# Patient Record
Sex: FEMALE | Race: WHITE | NOT HISPANIC OR LATINO | URBAN - METROPOLITAN AREA
[De-identification: names, ages, dates, MRNs, and addresses within clinical notes are randomized per-mention and may not be internally consistent; named-entity substitution may affect disease eponyms.]

---

## 2020-07-25 ENCOUNTER — TELEPHONE ENCOUNTER (OUTPATIENT)
Dept: URBAN - METROPOLITAN AREA CLINIC 13 | Facility: CLINIC | Age: 70
End: 2020-07-25

## 2020-07-25 RX ORDER — POLYETHYLENE GLYCOL 3350, SODIUM SULFATE, SODIUM CHLORIDE, POTASSIUM CHLORIDE, ASCORBIC ACID, SODIUM ASCORBATE 7.5-2.691G
USE AS DIRECTED KIT ORAL
Qty: 1 | Refills: 0 | OUTPATIENT
Start: 2014-07-31 | End: 2014-10-07

## 2020-07-25 RX ORDER — POLYETHYLENE GLYCOL 3350, SODIUM SULFATE, SODIUM CHLORIDE, POTASSIUM CHLORIDE, ASCORBIC ACID, SODIUM ASCORBATE 7.5-2.691G
DRINK 1 LITER OF SOLUTION AT 5:00 PM THE DAY BEFORE PROCEDURE; DRINK 1 LITER OF SOLUTION 6 HOURS PRIOR TO PROCEDURE KIT ORAL
Qty: 2000 | Refills: 0 | OUTPATIENT
Start: 2019-06-27 | End: 2019-10-07

## 2020-07-25 RX ORDER — METOPROLOL SUCCINATE 25 MG/1
TAKE 1 TABLET DAILY TABLET, EXTENDED RELEASE ORAL
Refills: 0 | OUTPATIENT
Start: 2014-07-31 | End: 2016-12-07

## 2020-07-25 RX ORDER — PROPAFENONE HYDROCHLORIDE 150 MG/1
TAKE 1 TABLET DAILY TABLET, FILM COATED ORAL
Refills: 0 | OUTPATIENT
End: 2019-05-07

## 2020-07-26 ENCOUNTER — TELEPHONE ENCOUNTER (OUTPATIENT)
Dept: URBAN - METROPOLITAN AREA CLINIC 13 | Facility: CLINIC | Age: 70
End: 2020-07-26

## 2020-07-26 RX ORDER — NALOXONE HYDROCHLORIDE 4 MG/.1ML
SPRAY NASAL
Qty: 2 | Refills: 0 | Status: ACTIVE | COMMUNITY
Start: 2019-06-21

## 2020-07-26 RX ORDER — OMEPRAZOLE 40 MG/1
TAKE 1 CAPSULE BY MOUTH ONCE DAILY CAPSULE, DELAYED RELEASE ORAL
Qty: 90 | Refills: 3 | Status: ACTIVE | COMMUNITY
Start: 2014-10-09

## 2020-07-26 RX ORDER — CYCLOSPORINE 0.5 MG/ML
EMULSION OPHTHALMIC
Qty: 60 | Refills: 0 | Status: ACTIVE | COMMUNITY
Start: 2018-11-28

## 2020-07-26 RX ORDER — IBUPROFEN 800 MG/1
TABLET ORAL
Qty: 30 | Refills: 0 | Status: ACTIVE | COMMUNITY
Start: 2019-06-21

## 2020-07-26 RX ORDER — 1.1% SODIUM FLUORIDE 11 MG/G
USE TO BRUSH TEETH TWICE A DAY. DO NOT EAT OR DRINK FOR 30 MINUTES AFT GEL DENTAL
Qty: 56 | Refills: 0 | Status: ACTIVE | COMMUNITY
Start: 2019-05-21

## 2020-07-26 RX ORDER — PRAVASTATIN SODIUM 20 MG/1
TAKE 1 TABLET DAILY AS DIRECTED TABLET ORAL
Refills: 0 | Status: ACTIVE | COMMUNITY
Start: 2014-07-31

## 2020-07-26 RX ORDER — CYCLOSPORINE 0.5 MG/ML
EMULSION OPHTHALMIC
Qty: 60 | Refills: 0 | Status: ACTIVE | COMMUNITY
Start: 2019-05-16

## 2020-07-26 RX ORDER — CHLORHEXIDINE GLUCONATE 0.12% ORAL RINSE 1.2 MG/ML
SOLUTION ORAL
Qty: 473 | Refills: 0 | Status: ACTIVE | COMMUNITY
Start: 2019-06-21

## 2020-07-26 RX ORDER — FLECAINIDE ACETATE 100 MG/1
TAKE 1 TABLET EVERY 12 HOURS DAILY TABLET ORAL
Refills: 0 | Status: ACTIVE | COMMUNITY

## 2020-07-26 RX ORDER — PRAVASTATIN SODIUM 20 MG/1
TABLET ORAL
Qty: 30 | Refills: 0 | Status: ACTIVE | COMMUNITY
Start: 2013-12-03

## 2020-07-26 RX ORDER — ETODOLAC 400 MG/1
TAKE 1 TABLET BY MOUTH THREE TIMES DAILY AS NEEDED FOR PAIN TABLET, COATED ORAL
Qty: 15 | Refills: 0 | Status: ACTIVE | COMMUNITY
Start: 2019-07-01

## 2020-07-26 RX ORDER — HYDROMORPHONE HYDROCHLORIDE 4 MG/1
TABLET ORAL
Qty: 16 | Refills: 0 | Status: ACTIVE | COMMUNITY
Start: 2019-06-21

## 2020-07-26 RX ORDER — ONDANSETRON 8 MG/1
TABLET ORAL
Qty: 6 | Refills: 0 | Status: ACTIVE | COMMUNITY
Start: 2019-06-21

## 2020-07-26 RX ORDER — FLECAINIDE ACETATE 50 MG/1
TABLET ORAL
Qty: 60 | Refills: 0 | Status: ACTIVE | COMMUNITY
Start: 2019-06-10

## 2020-07-26 RX ORDER — NEBIVOLOL HYDROCHLORIDE 5 MG/1
TAKE 1 TABLET DAILY TABLET ORAL
Qty: 30 | Refills: 3 | Status: ACTIVE | COMMUNITY

## 2020-07-26 RX ORDER — CHLORHEXIDINE GLUCONATE 0.12% ORAL RINSE 1.2 MG/ML
SOLUTION ORAL
Qty: 473 | Refills: 0 | Status: ACTIVE | COMMUNITY
Start: 2019-08-28

## 2020-12-22 ENCOUNTER — TELEPHONE ENCOUNTER (OUTPATIENT)
Dept: URBAN - METROPOLITAN AREA CLINIC 113 | Facility: CLINIC | Age: 70
End: 2020-12-22

## 2020-12-22 RX ORDER — OMEPRAZOLE 40 MG/1
TAKE 1 CAPSULE BY MOUTH ONCE DAILY CAPSULE, DELAYED RELEASE ORAL ONCE A DAY
Qty: 90 | Refills: 2
Start: 2014-10-09

## 2021-09-14 ENCOUNTER — ERX REFILL RESPONSE (OUTPATIENT)
Dept: URBAN - METROPOLITAN AREA CLINIC 113 | Facility: CLINIC | Age: 71
End: 2021-09-14

## 2021-09-14 RX ORDER — OMEPRAZOLE 40 MG/1
TAKE 1 CAPSULE BY MOUTH ONCE DAILY CAPSULE, DELAYED RELEASE ORAL ONCE A DAY
Qty: 90 | Refills: 2 | OUTPATIENT

## 2021-09-14 RX ORDER — OMEPRAZOLE 40 MG/1
TAKE 1 CAPSULE BY MOUTH ONCE DAILY FOR 90 DAYS CAPSULE, DELAYED RELEASE ORAL
Qty: 90 CAPSULE | Refills: 1 | OUTPATIENT

## 2021-12-16 ENCOUNTER — TELEPHONE ENCOUNTER (OUTPATIENT)
Dept: URBAN - METROPOLITAN AREA CLINIC 113 | Facility: CLINIC | Age: 71
End: 2021-12-16

## 2021-12-16 ENCOUNTER — ERX REFILL RESPONSE (OUTPATIENT)
Dept: URBAN - METROPOLITAN AREA CLINIC 113 | Facility: CLINIC | Age: 71
End: 2021-12-16

## 2021-12-16 RX ORDER — OMEPRAZOLE 40 MG/1
1 CAPSULE 30 MINUTES BEFORE MORNING MEAL CAPSULE, DELAYED RELEASE ORAL ONCE A DAY
Qty: 90 | Refills: 0

## 2021-12-16 RX ORDER — OMEPRAZOLE 40 MG/1
TAKE 1 CAPSULE BY MOUTH ONCE DAILY FOR 90 DAYS CAPSULE, DELAYED RELEASE ORAL
Qty: 90 CAPSULE | Refills: 1 | OUTPATIENT

## 2021-12-16 RX ORDER — OMEPRAZOLE 40 MG/1
TAKE 1 CAPSULE BY MOUTH ONCE DAILY CAPSULE, DELAYED RELEASE ORAL
Qty: 90 CAPSULE | Refills: 1 | OUTPATIENT

## 2022-03-08 ENCOUNTER — TELEPHONE ENCOUNTER (OUTPATIENT)
Dept: URBAN - METROPOLITAN AREA CLINIC 113 | Facility: CLINIC | Age: 72
End: 2022-03-08

## 2022-03-08 RX ORDER — OMEPRAZOLE 40 MG/1
TAKE 1 CAPSULE BY MOUTH ONCE DAILY CAPSULE, DELAYED RELEASE ORAL ONCE A DAY
Qty: 90 | Refills: 0

## 2022-04-07 ENCOUNTER — OFFICE VISIT (OUTPATIENT)
Dept: URBAN - METROPOLITAN AREA CLINIC 113 | Facility: CLINIC | Age: 72
End: 2022-04-07

## 2022-05-05 ENCOUNTER — OFFICE VISIT (OUTPATIENT)
Dept: URBAN - METROPOLITAN AREA CLINIC 113 | Facility: CLINIC | Age: 72
End: 2022-05-05

## 2022-05-19 ENCOUNTER — LAB OUTSIDE AN ENCOUNTER (OUTPATIENT)
Dept: URBAN - METROPOLITAN AREA CLINIC 113 | Facility: CLINIC | Age: 72
End: 2022-05-19

## 2022-05-19 ENCOUNTER — CLAIMS CREATED FROM THE CLAIM WINDOW (OUTPATIENT)
Dept: URBAN - METROPOLITAN AREA CLINIC 113 | Facility: CLINIC | Age: 72
End: 2022-05-19
Payer: MEDICARE

## 2022-05-19 VITALS
BODY MASS INDEX: 24.1 KG/M2 | WEIGHT: 159 LBS | HEART RATE: 64 BPM | TEMPERATURE: 97.7 F | DIASTOLIC BLOOD PRESSURE: 74 MMHG | SYSTOLIC BLOOD PRESSURE: 118 MMHG | RESPIRATION RATE: 20 BRPM | HEIGHT: 68 IN

## 2022-05-19 DIAGNOSIS — R13.19 ESOPHAGEAL DYSPHAGIA: ICD-10-CM

## 2022-05-19 DIAGNOSIS — Z86.010 HISTORY OF ADENOMATOUS POLYP OF COLON: ICD-10-CM

## 2022-05-19 DIAGNOSIS — K21.9 GASTROESOPHAGEAL REFLUX DISEASE, UNSPECIFIED WHETHER ESOPHAGITIS PRESENT: ICD-10-CM

## 2022-05-19 PROCEDURE — 99204 OFFICE O/P NEW MOD 45 MIN: CPT | Performed by: NURSE PRACTITIONER

## 2022-05-19 RX ORDER — CYCLOSPORINE 0.5 MG/ML
EMULSION OPHTHALMIC
Qty: 60 | Refills: 0 | Status: ON HOLD | COMMUNITY
Start: 2018-11-28

## 2022-05-19 RX ORDER — FLECAINIDE ACETATE 50 MG/1
1 TABLET TABLET ORAL TWICE DAILY
Refills: 0 | Status: ON HOLD | COMMUNITY
Start: 2019-06-10

## 2022-05-19 RX ORDER — IBUPROFEN 800 MG/1
TABLET ORAL
Qty: 30 | Refills: 0 | Status: ON HOLD | COMMUNITY
Start: 2019-06-21

## 2022-05-19 RX ORDER — ETODOLAC 400 MG/1
TAKE 1 TABLET BY MOUTH THREE TIMES DAILY AS NEEDED FOR PAIN TABLET, COATED ORAL
Qty: 15 | Refills: 0 | Status: ON HOLD | COMMUNITY
Start: 2019-07-01

## 2022-05-19 RX ORDER — SODIUM, POTASSIUM,MAG SULFATES 17.5-3.13G
354 ML SOLUTION, RECONSTITUTED, ORAL ORAL ONCE
Qty: 354 MILLILITER | Refills: 0 | OUTPATIENT
Start: 2022-05-19 | End: 2022-05-20

## 2022-05-19 RX ORDER — NALOXONE HYDROCHLORIDE 4 MG/.1ML
SPRAY NASAL
Qty: 2 | Refills: 0 | Status: ON HOLD | COMMUNITY
Start: 2019-06-21

## 2022-05-19 RX ORDER — NEBIVOLOL HYDROCHLORIDE 5 MG/1
TAKE 1 TABLET DAILY TABLET ORAL
Qty: 30 | Refills: 3 | Status: ACTIVE | COMMUNITY

## 2022-05-19 RX ORDER — FLECAINIDE ACETATE 100 MG/1
TAKE 1 TABLET EVERY 12 HOURS DAILY TABLET ORAL
Refills: 0 | Status: ACTIVE | COMMUNITY

## 2022-05-19 RX ORDER — PRAVASTATIN SODIUM 20 MG/1
1 TABLET TABLET ORAL ONCE A DAY
Refills: 0 | Status: ACTIVE | COMMUNITY
Start: 2013-12-03

## 2022-05-19 RX ORDER — ONDANSETRON 8 MG/1
TABLET ORAL
Qty: 6 | Refills: 0 | Status: ON HOLD | COMMUNITY
Start: 2019-06-21

## 2022-05-19 RX ORDER — HYDROMORPHONE HYDROCHLORIDE 4 MG/1
TABLET ORAL
Qty: 16 | Refills: 0 | Status: ON HOLD | COMMUNITY
Start: 2019-06-21

## 2022-05-19 RX ORDER — 1.1% SODIUM FLUORIDE 11 MG/G
USE TO BRUSH TEETH TWICE A DAY. DO NOT EAT OR DRINK FOR 30 MINUTES AFT GEL DENTAL
Qty: 56 | Refills: 0 | Status: ON HOLD | COMMUNITY
Start: 2019-05-21

## 2022-05-19 RX ORDER — DENOSUMAB 60 MG/ML
AS DIRECTED INJECTION SUBCUTANEOUS
Status: ACTIVE | COMMUNITY

## 2022-05-19 RX ORDER — OMEPRAZOLE 40 MG/1
TAKE 1 CAPSULE BY MOUTH ONCE DAILY CAPSULE, DELAYED RELEASE ORAL ONCE A DAY
Qty: 90 | Refills: 0 | Status: ACTIVE | COMMUNITY

## 2022-05-19 RX ORDER — CHLORHEXIDINE GLUCONATE 0.12% ORAL RINSE 1.2 MG/ML
SOLUTION ORAL
Qty: 473 | Refills: 0 | Status: ON HOLD | COMMUNITY
Start: 2019-06-21

## 2022-05-19 RX ORDER — OMEPRAZOLE 40 MG/1
1 CAPSULE 30 MINUTES BEFORE MORNING MEAL CAPSULE, DELAYED RELEASE ORAL ONCE A DAY
Qty: 90 | Refills: 3 | OUTPATIENT

## 2022-05-19 NOTE — HPI-TODAY'S VISIT:
This is a 71-year-old female with a maternal history of colon cancer (50s) and a personal history of GERD, diverticulosis, and adenomatous colon polyps due surveillance in October 2022 presenting after a long interval for medication management and to discuss scheduling colonoscopy. She was last seen in October 2019 following surveillance colonoscopy during which a 13 mm adenoma was removed from the sigmoid colon.  She was taking omeprazole 40 mg daily and acid reflux was controlled.  She was to repeat a colonoscopy in 3 years.  She is compliant with omeprazole 40 mg daily.  She denies heartburn or regurgitation.  Occasionally, she has difficulty swallowing liquids describing material lodging in her esophagus relieved with time.  This does not occur with food.  Infrequently, she has a "little tickle" in her throat that she is unsure whether or not it is related to reflux.  She denies abdominal pain, nausea, or any other abdominal symptoms.

## 2022-05-19 NOTE — HPI-OTHER HISTORIES
Colonoscopy on October 7, 2019. This examination showed a 13 mm sigmoid colon polyp which was removed by hot snare, 2 sessile polyps in the hepatic flexure and cecum measuring 7-8 mm in size, removed by cold snare, 3 sessile polyps in the descending colon and splenic flexure, 2-3 mm in size, removed by cold biopsy forceps, and nonbleeding internal hemorrhoids. The sigmoid polyp was a tubular adenoma. Likewise, the cecal and descending colon polyps or tubular adenomas. The splenic flexure and descending colon polyps are hyperplastic. EGD 10/9/2014:Normal esophagus, chronic gastritis status post biopsy, normal examined duodenum.  Antral biopsies demonstrated mild chronic atrophic gastritis with focal nonspecific chronic active inflammation negative for intestinal metaplasia, dysplasia, or H. pylori.

## 2022-05-25 ENCOUNTER — WEB ENCOUNTER (OUTPATIENT)
Dept: URBAN - METROPOLITAN AREA CLINIC 113 | Facility: CLINIC | Age: 72
End: 2022-05-25

## 2022-05-26 ENCOUNTER — WEB ENCOUNTER (OUTPATIENT)
Dept: URBAN - METROPOLITAN AREA CLINIC 113 | Facility: CLINIC | Age: 72
End: 2022-05-26

## 2022-05-31 ENCOUNTER — WEB ENCOUNTER (OUTPATIENT)
Dept: URBAN - METROPOLITAN AREA CLINIC 113 | Facility: CLINIC | Age: 72
End: 2022-05-31

## 2022-06-23 PROBLEM — 40890009: Status: ACTIVE | Noted: 2022-05-19

## 2022-06-23 PROBLEM — 235595009: Status: ACTIVE | Noted: 2022-05-19

## 2022-07-14 ENCOUNTER — OFFICE VISIT (OUTPATIENT)
Dept: URBAN - METROPOLITAN AREA SURGERY CENTER 25 | Facility: SURGERY CENTER | Age: 72
End: 2022-07-14
Payer: MEDICARE

## 2022-07-14 ENCOUNTER — CLAIMS CREATED FROM THE CLAIM WINDOW (OUTPATIENT)
Dept: URBAN - METROPOLITAN AREA CLINIC 4 | Facility: CLINIC | Age: 72
End: 2022-07-14
Payer: MEDICARE

## 2022-07-14 DIAGNOSIS — R13.10 DYSPHAGIA: ICD-10-CM

## 2022-07-14 DIAGNOSIS — K31.7 POLYP OF STOMACH FUNDIC GLAND: ICD-10-CM

## 2022-07-14 DIAGNOSIS — K31.7 POLYP OF STOMACH AND DUODENUM: ICD-10-CM

## 2022-07-14 PROCEDURE — 88305 TISSUE EXAM BY PATHOLOGIST: CPT | Performed by: PATHOLOGY

## 2022-07-14 PROCEDURE — 43239 EGD BIOPSY SINGLE/MULTIPLE: CPT | Performed by: INTERNAL MEDICINE

## 2022-07-14 PROCEDURE — G8907 PT DOC NO EVENTS ON DISCHARG: HCPCS | Performed by: INTERNAL MEDICINE

## 2022-07-14 PROCEDURE — 43248 EGD GUIDE WIRE INSERTION: CPT | Performed by: INTERNAL MEDICINE

## 2022-07-14 RX ORDER — OMEPRAZOLE 40 MG/1
1 CAPSULE 30 MINUTES BEFORE MORNING MEAL CAPSULE, DELAYED RELEASE ORAL ONCE A DAY
Qty: 90 | Refills: 3 | Status: ACTIVE | COMMUNITY

## 2022-07-14 RX ORDER — 1.1% SODIUM FLUORIDE 11 MG/G
USE TO BRUSH TEETH TWICE A DAY. DO NOT EAT OR DRINK FOR 30 MINUTES AFT GEL DENTAL
Qty: 56 | Refills: 0 | Status: ON HOLD | COMMUNITY
Start: 2019-05-21

## 2022-07-14 RX ORDER — FLECAINIDE ACETATE 100 MG/1
TAKE 1 TABLET EVERY 12 HOURS DAILY TABLET ORAL
Refills: 0 | Status: ACTIVE | COMMUNITY

## 2022-07-14 RX ORDER — CHLORHEXIDINE GLUCONATE 0.12% ORAL RINSE 1.2 MG/ML
SOLUTION ORAL
Qty: 473 | Refills: 0 | Status: ON HOLD | COMMUNITY
Start: 2019-06-21

## 2022-07-14 RX ORDER — IBUPROFEN 800 MG/1
TABLET ORAL
Qty: 30 | Refills: 0 | Status: ON HOLD | COMMUNITY
Start: 2019-06-21

## 2022-07-14 RX ORDER — ETODOLAC 400 MG/1
TAKE 1 TABLET BY MOUTH THREE TIMES DAILY AS NEEDED FOR PAIN TABLET, COATED ORAL
Qty: 15 | Refills: 0 | Status: ON HOLD | COMMUNITY
Start: 2019-07-01

## 2022-07-14 RX ORDER — ONDANSETRON 8 MG/1
TABLET ORAL
Qty: 6 | Refills: 0 | Status: ON HOLD | COMMUNITY
Start: 2019-06-21

## 2022-07-14 RX ORDER — NEBIVOLOL HYDROCHLORIDE 5 MG/1
TAKE 1 TABLET DAILY TABLET ORAL
Qty: 30 | Refills: 3 | Status: ACTIVE | COMMUNITY

## 2022-07-14 RX ORDER — NALOXONE HYDROCHLORIDE 4 MG/.1ML
SPRAY NASAL
Qty: 2 | Refills: 0 | Status: ON HOLD | COMMUNITY
Start: 2019-06-21

## 2022-07-14 RX ORDER — HYDROMORPHONE HYDROCHLORIDE 4 MG/1
TABLET ORAL
Qty: 16 | Refills: 0 | Status: ON HOLD | COMMUNITY
Start: 2019-06-21

## 2022-07-14 RX ORDER — FLECAINIDE ACETATE 50 MG/1
1 TABLET TABLET ORAL TWICE DAILY
Refills: 0 | Status: ON HOLD | COMMUNITY
Start: 2019-06-10

## 2022-07-14 RX ORDER — DENOSUMAB 60 MG/ML
AS DIRECTED INJECTION SUBCUTANEOUS
Status: ACTIVE | COMMUNITY

## 2022-07-14 RX ORDER — OMEPRAZOLE 40 MG/1
TAKE 1 CAPSULE BY MOUTH ONCE DAILY CAPSULE, DELAYED RELEASE ORAL ONCE A DAY
Qty: 90 | Refills: 0 | Status: ACTIVE | COMMUNITY

## 2022-07-14 RX ORDER — CYCLOSPORINE 0.5 MG/ML
EMULSION OPHTHALMIC
Qty: 60 | Refills: 0 | Status: ON HOLD | COMMUNITY
Start: 2018-11-28

## 2022-07-14 RX ORDER — PRAVASTATIN SODIUM 20 MG/1
1 TABLET TABLET ORAL ONCE A DAY
Refills: 0 | Status: ACTIVE | COMMUNITY
Start: 2013-12-03

## 2022-07-21 PROBLEM — 92411005 BENIGN NEOPLASM OF STOMACH: Status: ACTIVE | Noted: 2022-07-21

## 2022-07-21 PROBLEM — 40739000 DYSPHAGIA: Status: ACTIVE | Noted: 2022-07-21

## 2022-07-25 ENCOUNTER — WEB ENCOUNTER (OUTPATIENT)
Dept: URBAN - METROPOLITAN AREA CLINIC 113 | Facility: CLINIC | Age: 72
End: 2022-07-25

## 2022-08-01 ENCOUNTER — CLAIMS CREATED FROM THE CLAIM WINDOW (OUTPATIENT)
Dept: URBAN - METROPOLITAN AREA CLINIC 4 | Facility: CLINIC | Age: 72
End: 2022-08-01
Payer: MEDICARE

## 2022-08-01 ENCOUNTER — WEB ENCOUNTER (OUTPATIENT)
Dept: URBAN - METROPOLITAN AREA SURGERY CENTER 25 | Facility: SURGERY CENTER | Age: 72
End: 2022-08-01

## 2022-08-01 ENCOUNTER — OFFICE VISIT (OUTPATIENT)
Dept: URBAN - METROPOLITAN AREA SURGERY CENTER 25 | Facility: SURGERY CENTER | Age: 72
End: 2022-08-01
Payer: MEDICARE

## 2022-08-01 DIAGNOSIS — D12.2 BENIGN NEOPLASM OF ASCENDING COLON: ICD-10-CM

## 2022-08-01 DIAGNOSIS — D12.4 BENIGN NEOPLASM OF DESCENDING COLON: ICD-10-CM

## 2022-08-01 DIAGNOSIS — Z86.010 HISTORY OF ADENOMATOUS POLYP OF COLON: ICD-10-CM

## 2022-08-01 DIAGNOSIS — D12.2 ADENOMA OF ASCENDING COLON: ICD-10-CM

## 2022-08-01 DIAGNOSIS — K63.89 OTHER SPECIFIED DISEASES OF INTESTINE: ICD-10-CM

## 2022-08-01 DIAGNOSIS — D12.4 ADENOMA OF DESCENDING COLON: ICD-10-CM

## 2022-08-01 PROCEDURE — 45385 COLONOSCOPY W/LESION REMOVAL: CPT | Performed by: INTERNAL MEDICINE

## 2022-08-01 PROCEDURE — 88305 TISSUE EXAM BY PATHOLOGIST: CPT | Performed by: PATHOLOGY

## 2022-08-01 PROCEDURE — G8907 PT DOC NO EVENTS ON DISCHARG: HCPCS | Performed by: INTERNAL MEDICINE

## 2022-08-01 RX ORDER — CYCLOSPORINE 0.5 MG/ML
EMULSION OPHTHALMIC
Qty: 60 | Refills: 0 | Status: ON HOLD | COMMUNITY
Start: 2018-11-28

## 2022-08-01 RX ORDER — FLECAINIDE ACETATE 50 MG/1
1 TABLET TABLET ORAL TWICE DAILY
Refills: 0 | Status: ON HOLD | COMMUNITY
Start: 2019-06-10

## 2022-08-01 RX ORDER — NEBIVOLOL HYDROCHLORIDE 5 MG/1
TAKE 1 TABLET DAILY TABLET ORAL
Qty: 30 | Refills: 3 | Status: ACTIVE | COMMUNITY

## 2022-08-01 RX ORDER — IBUPROFEN 800 MG/1
TABLET ORAL
Qty: 30 | Refills: 0 | Status: ON HOLD | COMMUNITY
Start: 2019-06-21

## 2022-08-01 RX ORDER — ETODOLAC 400 MG/1
TAKE 1 TABLET BY MOUTH THREE TIMES DAILY AS NEEDED FOR PAIN TABLET, COATED ORAL
Qty: 15 | Refills: 0 | Status: ON HOLD | COMMUNITY
Start: 2019-07-01

## 2022-08-01 RX ORDER — NALOXONE HYDROCHLORIDE 4 MG/.1ML
SPRAY NASAL
Qty: 2 | Refills: 0 | Status: ON HOLD | COMMUNITY
Start: 2019-06-21

## 2022-08-01 RX ORDER — 1.1% SODIUM FLUORIDE 11 MG/G
USE TO BRUSH TEETH TWICE A DAY. DO NOT EAT OR DRINK FOR 30 MINUTES AFT GEL DENTAL
Qty: 56 | Refills: 0 | Status: ON HOLD | COMMUNITY
Start: 2019-05-21

## 2022-08-01 RX ORDER — FLECAINIDE ACETATE 100 MG/1
TAKE 1 TABLET EVERY 12 HOURS DAILY TABLET ORAL
Refills: 0 | Status: ACTIVE | COMMUNITY

## 2022-08-01 RX ORDER — ONDANSETRON 8 MG/1
TABLET ORAL
Qty: 6 | Refills: 0 | Status: ON HOLD | COMMUNITY
Start: 2019-06-21

## 2022-08-01 RX ORDER — CHLORHEXIDINE GLUCONATE 0.12% ORAL RINSE 1.2 MG/ML
SOLUTION ORAL
Qty: 473 | Refills: 0 | Status: ON HOLD | COMMUNITY
Start: 2019-06-21

## 2022-08-01 RX ORDER — PRAVASTATIN SODIUM 20 MG/1
1 TABLET TABLET ORAL ONCE A DAY
Refills: 0 | Status: ACTIVE | COMMUNITY
Start: 2013-12-03

## 2022-08-01 RX ORDER — OMEPRAZOLE 40 MG/1
1 CAPSULE 30 MINUTES BEFORE MORNING MEAL CAPSULE, DELAYED RELEASE ORAL ONCE A DAY
Qty: 90 | Refills: 3 | Status: ACTIVE | COMMUNITY

## 2022-08-01 RX ORDER — OMEPRAZOLE 40 MG/1
TAKE 1 CAPSULE BY MOUTH ONCE DAILY CAPSULE, DELAYED RELEASE ORAL ONCE A DAY
Qty: 90 | Refills: 0 | Status: ACTIVE | COMMUNITY

## 2022-08-01 RX ORDER — DENOSUMAB 60 MG/ML
AS DIRECTED INJECTION SUBCUTANEOUS
Status: ACTIVE | COMMUNITY

## 2022-08-01 RX ORDER — HYDROMORPHONE HYDROCHLORIDE 4 MG/1
TABLET ORAL
Qty: 16 | Refills: 0 | Status: ON HOLD | COMMUNITY
Start: 2019-06-21

## 2022-08-09 PROBLEM — 429047008: Status: ACTIVE | Noted: 2022-05-19

## 2022-08-19 ENCOUNTER — OFFICE VISIT (OUTPATIENT)
Dept: URBAN - METROPOLITAN AREA CLINIC 107 | Facility: CLINIC | Age: 72
End: 2022-08-19

## 2022-09-23 ENCOUNTER — OFFICE VISIT (OUTPATIENT)
Dept: URBAN - METROPOLITAN AREA CLINIC 113 | Facility: CLINIC | Age: 72
End: 2022-09-23

## 2023-06-05 ENCOUNTER — WEB ENCOUNTER (OUTPATIENT)
Dept: URBAN - METROPOLITAN AREA CLINIC 113 | Facility: CLINIC | Age: 73
End: 2023-06-05

## 2023-06-05 RX ORDER — OMEPRAZOLE 40 MG/1
1 CAPSULE 30 MINUTES BEFORE MORNING MEAL CAPSULE, DELAYED RELEASE ORAL ONCE A DAY
Qty: 90 | Refills: 1

## 2023-11-27 ENCOUNTER — ERX REFILL RESPONSE (OUTPATIENT)
Dept: URBAN - METROPOLITAN AREA CLINIC 113 | Facility: CLINIC | Age: 73
End: 2023-11-27

## 2023-11-27 RX ORDER — OMEPRAZOLE 40 MG/1
TAKE 1 CAPSULE BY MOUTH ONCE DAILY 30 MINUTES BEFORE BREAKFAST CAPSULE, DELAYED RELEASE ORAL
Qty: 90 CAPSULE | Refills: 0 | OUTPATIENT

## 2023-11-27 RX ORDER — OMEPRAZOLE 40 MG/1
1 CAPSULE 30 MINUTES BEFORE MORNING MEAL CAPSULE, DELAYED RELEASE ORAL ONCE A DAY
Qty: 90 | Refills: 1 | OUTPATIENT

## 2023-12-07 ENCOUNTER — OFFICE VISIT (OUTPATIENT)
Dept: URBAN - METROPOLITAN AREA CLINIC 113 | Facility: CLINIC | Age: 73
End: 2023-12-07

## 2024-02-09 ENCOUNTER — OV EP (OUTPATIENT)
Dept: URBAN - METROPOLITAN AREA CLINIC 113 | Facility: CLINIC | Age: 74
End: 2024-02-09
Payer: MEDICARE

## 2024-02-09 VITALS
RESPIRATION RATE: 18 BRPM | DIASTOLIC BLOOD PRESSURE: 70 MMHG | BODY MASS INDEX: 24.55 KG/M2 | TEMPERATURE: 97.6 F | HEIGHT: 68 IN | HEART RATE: 62 BPM | WEIGHT: 162 LBS | SYSTOLIC BLOOD PRESSURE: 118 MMHG

## 2024-02-09 DIAGNOSIS — R13.10 DYSPHAGIA: ICD-10-CM

## 2024-02-09 DIAGNOSIS — K31.7 POLYP OF STOMACH FUNDIC GLAND: ICD-10-CM

## 2024-02-09 DIAGNOSIS — K21.9 GASTROESOPHAGEAL REFLUX DISEASE, UNSPECIFIED WHETHER ESOPHAGITIS PRESENT: ICD-10-CM

## 2024-02-09 DIAGNOSIS — R13.19 ESOPHAGEAL DYSPHAGIA: ICD-10-CM

## 2024-02-09 DIAGNOSIS — Z86.010 HISTORY OF ADENOMATOUS POLYP OF COLON: ICD-10-CM

## 2024-02-09 PROCEDURE — 99213 OFFICE O/P EST LOW 20 MIN: CPT | Performed by: INTERNAL MEDICINE

## 2024-02-09 RX ORDER — CYCLOSPORINE 0.5 MG/ML
EMULSION OPHTHALMIC
Qty: 60 | Refills: 0 | Status: ON HOLD | COMMUNITY
Start: 2018-11-28

## 2024-02-09 RX ORDER — FLECAINIDE ACETATE 50 MG/1
1 TABLET TABLET ORAL TWICE DAILY
Refills: 0 | Status: ON HOLD | COMMUNITY
Start: 2019-06-10

## 2024-02-09 RX ORDER — NEBIVOLOL HYDROCHLORIDE 5 MG/1
TAKE 1 TABLET DAILY TABLET ORAL
Qty: 30 | Refills: 3 | Status: ACTIVE | COMMUNITY

## 2024-02-09 RX ORDER — OMEPRAZOLE 40 MG/1
TAKE 1 CAPSULE BY MOUTH ONCE DAILY 30 MINUTES BEFORE BREAKFAST CAPSULE, DELAYED RELEASE ORAL ONCE A DAY
Qty: 90 | Refills: 3

## 2024-02-09 RX ORDER — NALOXONE HYDROCHLORIDE 4 MG/.1ML
SPRAY NASAL
Qty: 2 | Refills: 0 | Status: ON HOLD | COMMUNITY
Start: 2019-06-21

## 2024-02-09 RX ORDER — ONDANSETRON 8 MG/1
TABLET ORAL
Qty: 6 | Refills: 0 | Status: ON HOLD | COMMUNITY
Start: 2019-06-21

## 2024-02-09 RX ORDER — FLECAINIDE ACETATE 100 MG/1
TAKE 1 TABLET EVERY 12 HOURS DAILY TABLET ORAL
Refills: 0 | Status: ACTIVE | COMMUNITY

## 2024-02-09 RX ORDER — ETODOLAC 400 MG/1
TAKE 1 TABLET BY MOUTH THREE TIMES DAILY AS NEEDED FOR PAIN TABLET, COATED ORAL
Qty: 15 | Refills: 0 | Status: ON HOLD | COMMUNITY
Start: 2019-07-01

## 2024-02-09 RX ORDER — HYDROMORPHONE HYDROCHLORIDE 4 MG/1
TABLET ORAL
Qty: 16 | Refills: 0 | Status: ON HOLD | COMMUNITY
Start: 2019-06-21

## 2024-02-09 RX ORDER — 1.1% SODIUM FLUORIDE 11 MG/G
USE TO BRUSH TEETH TWICE A DAY. DO NOT EAT OR DRINK FOR 30 MINUTES AFT GEL DENTAL
Qty: 56 | Refills: 0 | Status: ON HOLD | COMMUNITY
Start: 2019-05-21

## 2024-02-09 RX ORDER — OMEPRAZOLE 40 MG/1
TAKE 1 CAPSULE BY MOUTH ONCE DAILY 30 MINUTES BEFORE BREAKFAST CAPSULE, DELAYED RELEASE ORAL
Qty: 90 CAPSULE | Refills: 0 | Status: ACTIVE | COMMUNITY

## 2024-02-09 RX ORDER — DENOSUMAB 60 MG/ML
AS DIRECTED INJECTION SUBCUTANEOUS
Status: ACTIVE | COMMUNITY

## 2024-02-09 RX ORDER — CHLORHEXIDINE GLUCONATE 0.12% ORAL RINSE 1.2 MG/ML
SOLUTION ORAL
Qty: 473 | Refills: 0 | Status: ON HOLD | COMMUNITY
Start: 2019-06-21

## 2024-02-09 RX ORDER — IBUPROFEN 800 MG/1
TABLET ORAL
Qty: 30 | Refills: 0 | Status: ON HOLD | COMMUNITY
Start: 2019-06-21

## 2024-02-09 RX ORDER — PRAVASTATIN SODIUM 20 MG/1
1 TABLET TABLET ORAL ONCE A DAY
Refills: 0 | Status: ACTIVE | COMMUNITY
Start: 2013-12-03

## 2024-02-09 NOTE — HPI-TODAY'S VISIT:
This is a 73-year-old female with a maternal history of colon cancer (50s) and a personal history of GERD, diverticulosis, and adenomatous colon polyps presenting after a long interval for medication management and to discuss results of her recent EGD and colonoscopy. She was last seen here on 5/19/22.  She was seen in October 2019 following surveillance colonoscopy during which a 13 mm adenoma was removed from the sigmoid colon.  She was taking omeprazole 40 mg daily and acid reflux was controlled.  She was to repeat a colonoscopy in 3 years.  Colonoscopy on October 7, 2019. This examination showed a 13 mm sigmoid colon polyp which was removed by hot snare, 2 sessile polyps in the hepatic flexure and cecum measuring 7-8 mm in size, removed by cold snare, 3 sessile polyps in the descending colon and splenic flexure, 2-3 mm in size, removed by cold biopsy forceps, and nonbleeding internal hemorrhoids. The sigmoid polyp was a tubular adenoma. Likewise, the cecal and descending colon polyps or tubular adenomas. The splenic flexure and descending colon polyps are hyperplastic.  At the time of her 5/19/22 office visit, she was compliant with omeprazole 40 mg daily and denied heartburn or regurgitation. Occasionally, she had difficulty swallowing liquids describing material lodging in her esophagus relieved with time. This does not occur with food. Infrequently, she had a "little tickle" in her throat that she was unsure whether or not it was related to reflux. She denied abdominal pain, nausea, or any other abdominal symptoms.   The patient had some complaints of dysphagia, and had an EGD done on July 14, 2022 which showed gastric fundic gland polyps.  She was empirically dilated with a 45 [place] savory dilator at that time.  She underwent colonoscopy and was noted to have 4 polyps, which were a mixture of tubular adenomas and hyperplastic polyps.  She also had diverticulosis and internal hemorrhoids, grade 1.  Repeat colonoscopy recommended for 2027.

## 2025-02-04 ENCOUNTER — ERX REFILL RESPONSE (OUTPATIENT)
Dept: URBAN - METROPOLITAN AREA CLINIC 113 | Facility: CLINIC | Age: 75
End: 2025-02-04

## 2025-02-04 RX ORDER — OMEPRAZOLE 40 MG/1
TAKE 1 CAPSULE BY MOUTH ONCE DAILY 30 MINUTES BEFORE BREAKFAST CAPSULE, DELAYED RELEASE ORAL ONCE A DAY
Qty: 90 | Refills: 3 | OUTPATIENT

## 2025-02-04 RX ORDER — OMEPRAZOLE 40 MG/1
TAKE 1 CAPSULE BY MOUTH ONCE DAILY 30  MINUTES  BEFORE  BREAKFAST CAPSULE, DELAYED RELEASE ORAL
Qty: 90 CAPSULE | Refills: 0 | OUTPATIENT

## 2025-05-01 ENCOUNTER — ERX REFILL RESPONSE (OUTPATIENT)
Dept: URBAN - METROPOLITAN AREA CLINIC 113 | Facility: CLINIC | Age: 75
End: 2025-05-01

## 2025-05-01 RX ORDER — OMEPRAZOLE 40 MG/1
TAKE 1 CAPSULE BY MOUTH ONCE DAILY 30 MINUTES BEFORE BREAKFAST CAPSULE, DELAYED RELEASE ORAL
Qty: 90 CAPSULE | Refills: 0

## 2025-07-27 ENCOUNTER — ERX REFILL RESPONSE (OUTPATIENT)
Dept: URBAN - METROPOLITAN AREA CLINIC 113 | Facility: CLINIC | Age: 75
End: 2025-07-27

## 2025-07-27 RX ORDER — OMEPRAZOLE 40 MG/1
TAKE 1 CAPSULE BY MOUTH ONCE DAILY 30 MINUTES BEFORE BREAKFAST CAPSULE, DELAYED RELEASE ORAL
Qty: 90 CAPSULE | Refills: 0